# Patient Record
Sex: MALE | Race: WHITE
[De-identification: names, ages, dates, MRNs, and addresses within clinical notes are randomized per-mention and may not be internally consistent; named-entity substitution may affect disease eponyms.]

---

## 2019-02-13 LAB
ANION GAP SERPL CALC-SCNC: 5 MMOL/L (ref 5–19)
APPEARANCE UR: CLEAR
APTT PPP: YELLOW S
BILIRUB UR QL STRIP: NEGATIVE
BUN SERPL-MCNC: 15 MG/DL (ref 7–20)
CALCIUM: 9.3 MG/DL (ref 8.4–10.2)
CHLORIDE SERPL-SCNC: 106 MMOL/L (ref 98–107)
CO2 SERPL-SCNC: 30 MMOL/L (ref 22–30)
ERYTHROCYTE [DISTWIDTH] IN BLOOD BY AUTOMATED COUNT: 13.8 % (ref 11.5–14)
GLUCOSE SERPL-MCNC: 78 MG/DL (ref 75–110)
GLUCOSE UR STRIP-MCNC: NEGATIVE MG/DL
HCT VFR BLD CALC: 42.3 % (ref 37.9–51)
HGB BLD-MCNC: 14.7 G/DL (ref 13.5–17)
KETONES UR STRIP-MCNC: NEGATIVE MG/DL
MCH RBC QN AUTO: 31.2 PG (ref 27–33.4)
MCHC RBC AUTO-ENTMCNC: 34.8 G/DL (ref 32–36)
MCV RBC AUTO: 90 FL (ref 80–97)
NITRITE UR QL STRIP: NEGATIVE
PH UR STRIP: 5 [PH] (ref 5–9)
PLATELET # BLD: 343 10^3/UL (ref 150–450)
POTASSIUM SERPL-SCNC: 4.9 MMOL/L (ref 3.6–5)
PROT UR STRIP-MCNC: NEGATIVE MG/DL
RBC # BLD AUTO: 4.72 10^6/UL (ref 4.35–5.55)
SODIUM SERPL-SCNC: 141.2 MMOL/L (ref 137–145)
SP GR UR STRIP: 1.02
UROBILINOGEN UR-MCNC: NEGATIVE MG/DL (ref ?–2)
WBC # BLD AUTO: 6.3 10^3/UL (ref 4–10.5)

## 2019-02-13 NOTE — RADIOLOGY REPORT (SQ)
EXAM DESCRIPTION:  CHEST PA/LATERAL



COMPLETED DATE/TIME:  2/13/2019 10:51 am



REASON FOR STUDY:  PRE-OP



COMPARISON:  None.



EXAM PARAMETERS:  NUMBER OF VIEWS: two views

TECHNIQUE: Digital Frontal and Lateral radiographic views of the chest acquired.

RADIATION DOSE: NA

LIMITATIONS: none



FINDINGS:  LUNGS AND PLEURA: No opacities, masses or pneumothorax. No pleural effusion.

MEDIASTINUM AND HILAR STRUCTURES: No masses or contour abnormalities.

HEART AND VASCULAR STRUCTURES: Heart normal size.  No evidence for failure.

BONES: No acute findings.

HARDWARE: None in the chest.

OTHER: No other significant finding.



IMPRESSION:  NO SIGNIFICANT RADIOGRAPHIC FINDING IN THE CHEST.



TECHNICAL DOCUMENTATION:  JOB ID:  [id number]

 2011 [employer]- All Rights Reserved



Reading location - IP/workstation name: [email or web address]

## 2019-02-14 NOTE — EKG REPORT
SEVERITY:- ABNORMAL ECG -

SINUS RHYTHM

FIRST DEGREE AV BLOCK

LEFT BUNDLE BRANCH BLOCK

:

Confirmed by: Isis Layton MD 14-Feb-2019 00:25:15

## 2019-02-21 ENCOUNTER — HOSPITAL ENCOUNTER (OUTPATIENT)
Dept: HOSPITAL 62 - OROUT | Age: 54
Discharge: HOME | End: 2019-02-21
Attending: ORTHOPAEDIC SURGERY
Payer: COMMERCIAL

## 2019-02-21 VITALS — SYSTOLIC BLOOD PRESSURE: 149 MMHG | DIASTOLIC BLOOD PRESSURE: 76 MMHG

## 2019-02-21 DIAGNOSIS — F17.210: ICD-10-CM

## 2019-02-21 DIAGNOSIS — Z79.899: ICD-10-CM

## 2019-02-21 DIAGNOSIS — M75.111: Primary | ICD-10-CM

## 2019-02-21 DIAGNOSIS — Z79.1: ICD-10-CM

## 2019-02-21 DIAGNOSIS — M25.511: ICD-10-CM

## 2019-02-21 PROCEDURE — 80048 BASIC METABOLIC PNL TOTAL CA: CPT

## 2019-02-21 PROCEDURE — 81001 URINALYSIS AUTO W/SCOPE: CPT

## 2019-02-21 PROCEDURE — 29827 SHO ARTHRS SRG RT8TR CUF RPR: CPT

## 2019-02-21 PROCEDURE — 85027 COMPLETE CBC AUTOMATED: CPT

## 2019-02-21 PROCEDURE — C1713 ANCHOR/SCREW BN/BN,TIS/BN: HCPCS

## 2019-02-21 PROCEDURE — 71046 X-RAY EXAM CHEST 2 VIEWS: CPT

## 2019-02-21 PROCEDURE — 36415 COLL VENOUS BLD VENIPUNCTURE: CPT

## 2019-02-21 PROCEDURE — 93005 ELECTROCARDIOGRAM TRACING: CPT

## 2019-02-21 PROCEDURE — 93010 ELECTROCARDIOGRAM REPORT: CPT

## 2019-02-21 RX ADMIN — FENTANYL CITRATE ONE MCG: 50 INJECTION INTRAMUSCULAR; INTRAVENOUS at 16:45

## 2019-02-21 RX ADMIN — FENTANYL CITRATE ONE MCG: 50 INJECTION INTRAMUSCULAR; INTRAVENOUS at 16:50

## 2019-02-21 NOTE — OPERATIVE REPORT
Operative Report


DATE OF SURGERY: 02/21/19


PREOPERATIVE DIAGNOSIS: Right shoulder high-grade partial-thickness rotator cuff

tear


POSTOPERATIVE DIAGNOSIS: Right shoulder full-thickness rotator tear


OPERATION: Right shoulder arthroscopic debridement and rotator cuff repair


SURGEON: EVELIO NICHOLAS


ANESTHESIA: GA


TISSUE REMOVED OR ALTERED: None


COMPLICATIONS: 





None


ESTIMATED BLOOD LOSS: Less than 20 mL


INTRAOPERATIVE FINDINGS: As above


PROCEDURE: 











IMPLANTS: 4.75 bio composite swivel lock x2





DESCRIPTION OF PROCEDURE: Patient was brought to the operating room placed in 

supine position.  After successfully induced and intubated the patient patient 

was placed in the beachchair position the head and endotracheal tube was secured

appropriately.  The right shoulder was prepped and draped in a normal surgical 

fashion.  A timeout was done identifying the right  shoulder as the correct s

ite.  After inflating the glenohumeral joint with sterile saline solution an 11 

blade was used to establish the posterior portal.  The arthroscope was 

introduced and return of fluid was seen showing that we successfully penetrated 

the glenohumeral joint.  With the use of spinal needle we're able to yahaira the 

anterior portal and using an 11 blade able to establish anterior portal.  A 

cannula was introduced through the anterior portal.  At this point diagnostic 

scope was done.  Patient had some fraying and degenerative tearing of the 

anterior labrum but no SLAP lesion.  No loose bodies intact cartilage.  Patient 

had a full-thickness rotator cuff tear which was different from a high-grade 

tear based on MRI.


A lateral portal was established 11 blade. 


A percutaneous incision was then just adjacent to the acromion on the lateral 

aspect.  Through this percutaneous hole the awl was used to prepare the hole for

an anchor.  Moselle was percutaneously sent flushed with the bone just adjacent 

to the articular margin.  The anchor was preloaded with fiber tape.  At this 

point I redirected the camera to the subacromial space and did a formal 

bursectomy exposing the tear and preloaded anchor into the bone.  Sutures were 

passed through the anterior portal for proper suture management.  With the use 

of the scorpion and I proceeded to pass the sutures through the rotator cuff 

tendon with proper suture management was able to pass the strands either through

percutaneous hole or the anterior portal.  Once I was satisfied with placement 

of all my sutures I then proceeded to do my arthroscopic knots with the fiber 

wire that was preloaded in the swivel lock.  At this point the strands were used

to do our lateral row including the fiber tape that had been passed through the 

tendon.  Bicomposite show out was used and the lateral aspect of the humerus was

then cleaned off with a shaver and electrocautery.  Once identified once I was 

replacement I proceeded to use my awl to do my hole.  This this point the 

sutures were adequately tensioned and secured  securing and increasing the 

footprint of the rotator cuff repair.  Remaining strands were cut with the 

arthroscopic cutter.  Final pictures were taking showing my repair.  At this 

point fluid from the shoulder was removed camera and instruments were all 

removed.  I proceeded to close my portal sites with 3-0 nylon.  Xeroform 4 x 4 

dressing followed by ABDs pads and Medipore tape was applied.  Patient was 

placed in a sling and returned to supine position where he was successfully 

extubated and taken to PACU in stable condition.

## 2019-02-21 NOTE — DISCHARGE SUMMARY
Discharge Summary (SDC)





- Discharge


Final Diagnosis: 





Right shoulder arthroscopic rotator cuff repair


Date of Surgery: 02/21/19


Discharge Date: 02/21/19


Condition: Good


Forms:  [facility] Anesthesia D/C Instruction, Discharge POC-Surgical Service


Treatment or Instructions: 


Patient is instructed to follow up in 10-14 days.


Patient instructed to remove dressing in 4 days then can shower and apply Band-

Aids as needed.


Patient to wear sling for comfort but okay to remove for shower and pendulum 

exercises.


Pendulum exercises are instructed to be done 3 times a day ideally with 

breakfast, lunch, dinners and showers.


Patient instructed to call if there is any signs of redness or drainage fevers 

or chills.


Prescriptions: 


Oxycodone HCl/Acetaminophen [Percocet 5-325 mg Tablet] 1 - 2 tab PO ASDIR PRN 

#30 tablet


 PRN Reason: 


Referrals: 


EVELIO CHAPPELL MD [ACTIVE STAFF] - 03/04/19 1:30 pm


Discharge Diet: As Tolerated


Respiratory Treatments at Home: Deep Breathing/Coughing


Discharge Activity: No Driving, No Lifting/Push/Pulling, Slowly Increase 

Activity, Walk Frequently


Home Care Assistance: None Needed


Report the Following to Your Physician Immediately: Shortness of Breath, 

Vomiting, Increase in Pain, Fever over 101 Degrees, Unusual Bleeding, Redness, 

Swelling, Warmth, Increased Soreness, Drainage-Yellow, Drainage-Gray, Drainage-

Green, Drainage-Foul Smelling